# Patient Record
Sex: FEMALE | Race: OTHER | Employment: UNEMPLOYED | URBAN - METROPOLITAN AREA
[De-identification: names, ages, dates, MRNs, and addresses within clinical notes are randomized per-mention and may not be internally consistent; named-entity substitution may affect disease eponyms.]

---

## 2018-11-26 ENCOUNTER — HOSPITAL ENCOUNTER (EMERGENCY)
Facility: HOSPITAL | Age: 33
Discharge: HOME | End: 2018-11-27
Attending: EMERGENCY MEDICINE
Payer: COMMERCIAL

## 2018-11-26 DIAGNOSIS — N30.00 ACUTE CYSTITIS WITHOUT HEMATURIA: Primary | ICD-10-CM

## 2018-11-26 PROCEDURE — 81001 URINALYSIS AUTO W/SCOPE: CPT | Performed by: EMERGENCY MEDICINE

## 2018-11-26 PROCEDURE — 81001 URINALYSIS AUTO W/SCOPE: CPT

## 2018-11-26 PROCEDURE — 87086 URINE CULTURE/COLONY COUNT: CPT | Performed by: EMERGENCY MEDICINE

## 2018-11-26 PROCEDURE — 99283 EMERGENCY DEPT VISIT LOW MDM: CPT | Performed by: EMERGENCY MEDICINE

## 2018-11-27 VITALS
HEART RATE: 74 BPM | SYSTOLIC BLOOD PRESSURE: 111 MMHG | BODY MASS INDEX: 26.99 KG/M2 | DIASTOLIC BLOOD PRESSURE: 75 MMHG | OXYGEN SATURATION: 99 % | WEIGHT: 162 LBS | RESPIRATION RATE: 16 BRPM | TEMPERATURE: 98 F | HEIGHT: 65 IN

## 2018-11-27 RX ORDER — CIPROFLOXACIN 500 MG/1
500 TABLET, FILM COATED ORAL EVERY 12 HOURS SCHEDULED
Status: DISCONTINUED | OUTPATIENT
Start: 2018-11-27 | End: 2018-11-27

## 2018-11-27 RX ORDER — CEPHALEXIN 500 MG/1
500 CAPSULE ORAL 3 TIMES DAILY
Qty: 15 CAPSULE | Refills: 1 | Status: SHIPPED | OUTPATIENT
Start: 2018-11-27 | End: 2018-12-02

## 2018-11-27 RX ORDER — PHENAZOPYRIDINE HYDROCHLORIDE 200 MG/1
200 TABLET, FILM COATED ORAL ONCE
Status: COMPLETED | OUTPATIENT
Start: 2018-11-27 | End: 2018-11-27

## 2018-11-27 NOTE — ED INITIAL ASSESSMENT (HPI)
Pt to ER c/o urinary symptoms since Saturday. Pt has been taking Azo with moderate relief. Also reports lower back pain.

## 2018-11-27 NOTE — ED PROVIDER NOTES
Patient Seen in: [facility] Emergency Department    History   Patient presents with:  Urinary Symptoms (urologic)    Stated Complaint: uti sx    HPI    Burning in lower abdomen-states that she feels a going up into her back she has run out of her Az tenderness to palpation    ED Course     Labs Reviewed   URINALYSIS WITH CULTURE REFLEX - Abnormal; Notable for the following components:       Result Value    Urine Color Orange (*)     WBC Urine 21-50 (*)     RBC URINE 3-5 (*)     Squamous Epi.  Cells Lar

## 2019-06-05 ENCOUNTER — HOSPITAL ENCOUNTER (EMERGENCY)
Facility: HOSPITAL | Age: 34
Discharge: HOME | End: 2019-06-05
Attending: EMERGENCY MEDICINE
Payer: COMMERCIAL

## 2019-06-05 VITALS
BODY MASS INDEX: 25.83 KG/M2 | HEIGHT: 65 IN | DIASTOLIC BLOOD PRESSURE: 70 MMHG | WEIGHT: 155 LBS | SYSTOLIC BLOOD PRESSURE: 125 MMHG | TEMPERATURE: 98 F | HEART RATE: 88 BPM | RESPIRATION RATE: 18 BRPM | OXYGEN SATURATION: 98 %

## 2019-06-05 DIAGNOSIS — N39.0 URINARY TRACT INFECTION WITHOUT HEMATURIA, SITE UNSPECIFIED: Primary | ICD-10-CM

## 2019-06-05 PROCEDURE — 99283 EMERGENCY DEPT VISIT LOW MDM: CPT

## 2019-06-05 PROCEDURE — 81025 URINE PREGNANCY TEST: CPT

## 2019-06-05 PROCEDURE — 87086 URINE CULTURE/COLONY COUNT: CPT | Performed by: EMERGENCY MEDICINE

## 2019-06-05 PROCEDURE — 99284 EMERGENCY DEPT VISIT MOD MDM: CPT

## 2019-06-05 PROCEDURE — 81001 URINALYSIS AUTO W/SCOPE: CPT | Performed by: EMERGENCY MEDICINE

## 2019-06-05 RX ORDER — MELOXICAM 15 MG/1
15 TABLET ORAL DAILY
COMMUNITY

## 2019-06-05 RX ORDER — PHENAZOPYRIDINE HYDROCHLORIDE 100 MG/1
100 TABLET, FILM COATED ORAL 3 TIMES DAILY PRN
Qty: 6 TABLET | Refills: 0 | Status: SHIPPED | OUTPATIENT
Start: 2019-06-05 | End: 2019-06-08

## 2019-06-05 RX ORDER — SULFAMETHOXAZOLE AND TRIMETHOPRIM 800; 160 MG/1; MG/1
1 TABLET ORAL ONCE
Status: COMPLETED | OUTPATIENT
Start: 2019-06-05 | End: 2019-06-05

## 2019-06-05 RX ORDER — SULFAMETHOXAZOLE AND TRIMETHOPRIM 800; 160 MG/1; MG/1
1 TABLET ORAL 2 TIMES DAILY
Qty: 14 TABLET | Refills: 0 | Status: SHIPPED | OUTPATIENT
Start: 2019-06-05 | End: 2019-06-12

## 2019-06-05 NOTE — ED PROVIDER NOTES
Patient Seen in: [facility] Emergency Department    History   Patient presents with:  Urinary Symptoms (urologic)    Stated Complaint: uti symptoms    HPI    Patient is a 44-year-old female percents emergency room for evaluation of urinary symptoms. Oropharynx is unremarkable, no exudate. NECK: Supple, trachea midline, no lymphadenopathy. LUNG: Lungs clear to auscultation bilaterally, no wheezing, no rales, no rhonchi. CARDIOVASCULAR: Regular rate and rhythm. Normal S1S2. No S3S4 or murmur.   ABD but to return immediately to the ER for worsening, concerning, new, changing or persisting symptoms. I discussed the case with the patient and they had no questions, complaints, or concerns. Patient felt comfortable going home.                   Dispositi

## 2019-09-23 RX ORDER — CODEINE PHOSPHATE AND GUAIFENESIN 10; 100 MG/5ML; MG/5ML
10 SOLUTION ORAL EVERY 6 HOURS PRN
COMMUNITY

## 2019-09-23 RX ORDER — ERGOCALCIFEROL 1.25 MG/1
50000 CAPSULE ORAL WEEKLY
COMMUNITY

## 2019-09-23 RX ORDER — PREDNISONE 20 MG/1
20 TABLET ORAL DAILY
COMMUNITY

## 2019-09-23 RX ORDER — PROPRANOLOL/HYDROCHLOROTHIAZID 40 MG-25MG
1 TABLET ORAL DAILY
COMMUNITY

## 2019-10-01 RX ORDER — CLINDAMYCIN PHOSPHATE 600 MG/50ML
600 INJECTION INTRAVENOUS
Status: CANCELLED | OUTPATIENT
Start: 2019-10-01 | End: 2019-10-02

## 2019-10-01 NOTE — H&P
[facility]    History and Physical    Dez Gross Patient Status:  Hospital Outpatient Surgery    1985 MRN [id number]   [facility] SURGERY Attending Socorro Cheema MD   Hosp Day # 0 PCP None Pcp     Date:  10/1/2019  Date .0 06/17/2011    RPR NONREACTIVE 06/16/2011               Assessment/Plan:       Supracervical Laparoscopic Hysterectomy, Bilateral Salpingectomy, CLIP LEFT GONADAL VEIN            Md Christina Lee MD  10/1/2019

## 2019-10-02 ENCOUNTER — HOSPITAL ENCOUNTER (OUTPATIENT)
Facility: HOSPITAL | Age: 34
Setting detail: HOSPITAL OUTPATIENT SURGERY
Discharge: HOME | End: 2019-10-02
Attending: OBSTETRICS & GYNECOLOGY | Admitting: OBSTETRICS & GYNECOLOGY
Payer: COMMERCIAL

## 2019-10-02 ENCOUNTER — ANESTHESIA EVENT (OUTPATIENT)
Dept: SURGERY | Facility: HOSPITAL | Age: 34
End: 2019-10-02

## 2019-10-02 ENCOUNTER — ANESTHESIA (OUTPATIENT)
Dept: SURGERY | Facility: HOSPITAL | Age: 34
End: 2019-10-02

## 2019-10-02 VITALS
SYSTOLIC BLOOD PRESSURE: 100 MMHG | OXYGEN SATURATION: 96 % | HEART RATE: 73 BPM | BODY MASS INDEX: 28.23 KG/M2 | TEMPERATURE: 98 F | DIASTOLIC BLOOD PRESSURE: 62 MMHG | RESPIRATION RATE: 18 BRPM | HEIGHT: 65 IN | WEIGHT: 169.44 LBS

## 2019-10-02 PROCEDURE — 0UT74ZZ RESECTION OF BILATERAL FALLOPIAN TUBES, PERCUTANEOUS ENDOSCOPIC APPROACH: ICD-10-PCS | Performed by: OBSTETRICS & GYNECOLOGY

## 2019-10-02 PROCEDURE — 06BY4ZZ EXCISION OF LOWER VEIN, PERCUTANEOUS ENDOSCOPIC APPROACH: ICD-10-PCS | Performed by: OBSTETRICS & GYNECOLOGY

## 2019-10-02 PROCEDURE — 88307 TISSUE EXAM BY PATHOLOGIST: CPT | Performed by: OBSTETRICS & GYNECOLOGY

## 2019-10-02 PROCEDURE — 86850 RBC ANTIBODY SCREEN: CPT | Performed by: OBSTETRICS & GYNECOLOGY

## 2019-10-02 PROCEDURE — 86901 BLOOD TYPING SEROLOGIC RH(D): CPT | Performed by: OBSTETRICS & GYNECOLOGY

## 2019-10-02 PROCEDURE — 0UT94ZL RESECTION OF UTERUS, SUPRACERVICAL, PERCUTANEOUS ENDOSCOPIC APPROACH: ICD-10-PCS | Performed by: OBSTETRICS & GYNECOLOGY

## 2019-10-02 PROCEDURE — 88302 TISSUE EXAM BY PATHOLOGIST: CPT | Performed by: OBSTETRICS & GYNECOLOGY

## 2019-10-02 PROCEDURE — 86900 BLOOD TYPING SEROLOGIC ABO: CPT | Performed by: OBSTETRICS & GYNECOLOGY

## 2019-10-02 PROCEDURE — 51798 US URINE CAPACITY MEASURE: CPT | Performed by: OBSTETRICS & GYNECOLOGY

## 2019-10-02 RX ORDER — SCOLOPAMINE TRANSDERMAL SYSTEM 1 MG/1
PATCH, EXTENDED RELEASE TRANSDERMAL
Status: DISCONTINUED
Start: 2019-10-02 | End: 2019-10-02

## 2019-10-02 RX ORDER — HYDROMORPHONE HYDROCHLORIDE 1 MG/ML
0.5 INJECTION, SOLUTION INTRAMUSCULAR; INTRAVENOUS; SUBCUTANEOUS EVERY 5 MIN PRN
Status: ACTIVE | OUTPATIENT
Start: 2019-10-02 | End: 2019-10-02

## 2019-10-02 RX ORDER — HYDROCODONE BITARTRATE AND ACETAMINOPHEN 5; 325 MG/1; MG/1
2 TABLET ORAL AS NEEDED
Status: COMPLETED | OUTPATIENT
Start: 2019-10-02 | End: 2019-10-02

## 2019-10-02 RX ORDER — SCOLOPAMINE TRANSDERMAL SYSTEM 1 MG/1
1 PATCH, EXTENDED RELEASE TRANSDERMAL ONCE
Status: DISCONTINUED | OUTPATIENT
Start: 2019-10-02 | End: 2019-10-02

## 2019-10-02 RX ORDER — ONDANSETRON 2 MG/ML
4 INJECTION INTRAMUSCULAR; INTRAVENOUS AS NEEDED
Status: ACTIVE | OUTPATIENT
Start: 2019-10-02 | End: 2019-10-02

## 2019-10-02 RX ORDER — CLINDAMYCIN PHOSPHATE 900 MG/50ML
INJECTION INTRAVENOUS
Status: COMPLETED | OUTPATIENT
Start: 2019-10-02 | End: 2019-10-02

## 2019-10-02 RX ORDER — NALOXONE HYDROCHLORIDE 0.4 MG/ML
80 INJECTION, SOLUTION INTRAMUSCULAR; INTRAVENOUS; SUBCUTANEOUS AS NEEDED
Status: ACTIVE | OUTPATIENT
Start: 2019-10-02 | End: 2019-10-02

## 2019-10-02 RX ORDER — HYDROCODONE BITARTRATE AND ACETAMINOPHEN 5; 325 MG/1; MG/1
1 TABLET ORAL AS NEEDED
Status: COMPLETED | OUTPATIENT
Start: 2019-10-02 | End: 2019-10-02

## 2019-10-02 RX ORDER — SODIUM CHLORIDE, SODIUM LACTATE, POTASSIUM CHLORIDE, CALCIUM CHLORIDE 600; 310; 30; 20 MG/100ML; MG/100ML; MG/100ML; MG/100ML
INJECTION, SOLUTION INTRAVENOUS CONTINUOUS
Status: DISCONTINUED | OUTPATIENT
Start: 2019-10-02 | End: 2019-10-02

## 2019-10-02 RX ORDER — ACETAMINOPHEN 500 MG
1000 TABLET ORAL ONCE
Status: COMPLETED | OUTPATIENT
Start: 2019-10-02 | End: 2019-10-02

## 2019-10-02 RX ORDER — HYDROMORPHONE HYDROCHLORIDE 1 MG/ML
INJECTION, SOLUTION INTRAMUSCULAR; INTRAVENOUS; SUBCUTANEOUS
Status: COMPLETED
Start: 2019-10-02 | End: 2019-10-02

## 2019-10-02 RX ORDER — CLINDAMYCIN PHOSPHATE 900 MG/50ML
900 INJECTION INTRAVENOUS ONCE
Status: DISCONTINUED | OUTPATIENT
Start: 2019-10-02 | End: 2019-10-02 | Stop reason: HOSPADM

## 2019-10-02 RX ORDER — BUPIVACAINE HYDROCHLORIDE 5 MG/ML
INJECTION, SOLUTION EPIDURAL; INTRACAUDAL AS NEEDED
Status: DISCONTINUED | OUTPATIENT
Start: 2019-10-02 | End: 2019-10-02 | Stop reason: HOSPADM

## 2019-10-02 RX ORDER — PHENAZOPYRIDINE HYDROCHLORIDE 200 MG/1
200 TABLET, FILM COATED ORAL ONCE
Status: COMPLETED | OUTPATIENT
Start: 2019-10-02 | End: 2019-10-02

## 2019-10-02 RX ORDER — ACETAMINOPHEN 500 MG
1000 TABLET ORAL ONCE AS NEEDED
Status: DISCONTINUED | OUTPATIENT
Start: 2019-10-02 | End: 2019-10-02

## 2019-10-02 NOTE — ANESTHESIA POSTPROCEDURE EVALUATION
[address] Patient Status:  Hospital Outpatient Surgery   Age/Gender 36year old female MRN [id number]   [facility] SURGERY Attending Sandra Salgado MD   Hosp Day # 0 PCP None Pcp       Anesthesia Post-op Note    Pr
English

## 2019-10-02 NOTE — ANESTHESIA PREPROCEDURE EVALUATION
PRE-OP EVALUATION    Patient Name: Kieran Briceno    Pre-op Diagnosis: POLYCYSTIC OVARIAN SYNDROME    Procedure(s):  SUPRACERVICAL LAPAROSCOPIC HYSTERECTOMY, BILATERAL SALPINGECTOMY, CLIP LEFT GONADAL VEIN    Surgeon(s) and Role:     * Rodolfo Kenney, Cruzito Rojasor, Procedure Laterality Date   •       x 5   • IR VARICOSE VEIN ENDOVENOUS LASER ABLATION(CPT=36478)       Social History    Tobacco Use      Smoking status: Never Smoker      Smokeless tobacco: Never Used    Alcohol use: Never      Frequency: Neftali Willis

## 2019-10-02 NOTE — INTERVAL H&P NOTE
Pre-op Diagnosis: POLYCYSTIC OVARIAN SYNDROME    The above referenced H&P was reviewed by Md Dona Gordillo MD on 10/2/2019, the patient was examined and no significant changes have occurred in the patient's condition since the H&P was performed.   I disc

## 2019-10-02 NOTE — BRIEF OP NOTE
Pre-Operative Diagnosis: PELVIC PAIN     Post-Operative Diagnosis: same     Procedure Performed:   Procedure(s):  SUPRACERVICAL LAPAROSCOPIC HYSTERECTOMY, BILATERAL SALPINGECTOMY, Transection LEFT GONADAL VEIN    Surgeon(s) and Role:     * Luis Felipe Lara

## 2019-10-02 NOTE — OPERATIVE REPORT
[facility]    PATIENT'S NAME: Kali Seymourchana   ATTENDING PHYSICIAN: Lynne Lerner M.D. OPERATING PHYSICIAN: Lynne Lerner M.D.    PATIENT ACCOUNT#:   [de-identified]    LOCATION:  PACU [facility] PACU 10 EDWP 10  MEDICAL RECORD #:   [id number]       DATE OF NUVIA side of the umbilicus. A small rent was now made mid umbilicus with a #91 blade. Through this, a Veress needle was placed. Insufflation started with carbon dioxide gas.   When the pressure reached 14 mmHg, Veress needle was removed and replaced by laparo applied balloon port pierced the bag. Now, the laparoscope was placed through this right port. This allowed us to remove the 12 mm port at the mouth of the bag and replace it with the Rotocut Morcellator.   Under direct visualization, the morcellation was

## 2019-12-27 ENCOUNTER — HOSPITAL ENCOUNTER (EMERGENCY)
Facility: HOSPITAL | Age: 34
Discharge: HOME | End: 2019-12-28
Attending: STUDENT IN AN ORGANIZED HEALTH CARE EDUCATION/TRAINING PROGRAM
Payer: COMMERCIAL

## 2019-12-27 DIAGNOSIS — M54.89 BACK PAIN WITHOUT SCIATICA: ICD-10-CM

## 2019-12-27 DIAGNOSIS — R31.9 HEMATURIA, UNSPECIFIED TYPE: ICD-10-CM

## 2019-12-27 DIAGNOSIS — R10.9 ABDOMINAL PAIN OF UNKNOWN ETIOLOGY: Primary | ICD-10-CM

## 2019-12-27 LAB
ALBUMIN SERPL-MCNC: 3.5 G/DL (ref 3.4–5)
ALBUMIN/GLOB SERPL: 0.9 {RATIO} (ref 1–2)
ALP LIVER SERPL-CCNC: 82 U/L (ref 37–98)
ALT SERPL-CCNC: 13 U/L (ref 13–56)
ANION GAP SERPL CALC-SCNC: 6 MMOL/L (ref 0–18)
AST SERPL-CCNC: 19 U/L (ref 15–37)
BASOPHILS # BLD AUTO: 0.07 X10(3) UL (ref 0–0.2)
BASOPHILS NFR BLD AUTO: 0.6 %
BILIRUB SERPL-MCNC: 0.8 MG/DL (ref 0.1–2)
BILIRUB UR QL STRIP.AUTO: NEGATIVE
BUN BLD-MCNC: 9 MG/DL (ref 7–18)
BUN/CREAT SERPL: 10.8 (ref 10–20)
CALCIUM BLD-MCNC: 8.8 MG/DL (ref 8.5–10.1)
CHLORIDE SERPL-SCNC: 106 MMOL/L (ref 98–112)
CLARITY UR REFRACT.AUTO: CLEAR
CO2 SERPL-SCNC: 27 MMOL/L (ref 21–32)
COLOR UR AUTO: YELLOW
CREAT BLD-MCNC: 0.83 MG/DL (ref 0.55–1.02)
DEPRECATED RDW RBC AUTO: 38.5 FL (ref 35.1–46.3)
EOSINOPHIL # BLD AUTO: 0.19 X10(3) UL (ref 0–0.7)
EOSINOPHIL NFR BLD AUTO: 1.6 %
ERYTHROCYTE [DISTWIDTH] IN BLOOD BY AUTOMATED COUNT: 12.8 % (ref 11–15)
GLOBULIN PLAS-MCNC: 3.9 G/DL (ref 2.8–4.4)
GLUCOSE BLD-MCNC: 92 MG/DL (ref 70–99)
GLUCOSE UR STRIP.AUTO-MCNC: NEGATIVE MG/DL
HCT VFR BLD AUTO: 39.1 % (ref 35–48)
HGB BLD-MCNC: 12.3 G/DL (ref 12–16)
IMM GRANULOCYTES # BLD AUTO: 0.02 X10(3) UL (ref 0–1)
IMM GRANULOCYTES NFR BLD: 0.2 %
KETONES UR STRIP.AUTO-MCNC: NEGATIVE MG/DL
LEUKOCYTE ESTERASE UR QL STRIP.AUTO: NEGATIVE
LYMPHOCYTES # BLD AUTO: 3.88 X10(3) UL (ref 1–4)
LYMPHOCYTES NFR BLD AUTO: 31.9 %
M PROTEIN MFR SERPL ELPH: 7.4 G/DL (ref 6.4–8.2)
MCH RBC QN AUTO: 26.4 PG (ref 26–34)
MCHC RBC AUTO-ENTMCNC: 31.5 G/DL (ref 31–37)
MCV RBC AUTO: 83.9 FL (ref 80–100)
MONOCYTES # BLD AUTO: 0.9 X10(3) UL (ref 0.1–1)
MONOCYTES NFR BLD AUTO: 7.4 %
NEUTROPHILS # BLD AUTO: 7.09 X10 (3) UL (ref 1.5–7.7)
NEUTROPHILS # BLD AUTO: 7.09 X10(3) UL (ref 1.5–7.7)
NEUTROPHILS NFR BLD AUTO: 58.3 %
NITRITE UR QL STRIP.AUTO: NEGATIVE
OSMOLALITY SERPL CALC.SUM OF ELEC: 286 MOSM/KG (ref 275–295)
PH UR STRIP.AUTO: 6 [PH] (ref 4.5–8)
PLATELET # BLD AUTO: 252 10(3)UL (ref 150–450)
POTASSIUM SERPL-SCNC: 4.2 MMOL/L (ref 3.5–5.1)
PROT UR STRIP.AUTO-MCNC: NEGATIVE MG/DL
RBC # BLD AUTO: 4.66 X10(6)UL (ref 3.8–5.3)
SODIUM SERPL-SCNC: 139 MMOL/L (ref 136–145)
SP GR UR STRIP.AUTO: 1.01 (ref 1–1.03)
UROBILINOGEN UR STRIP.AUTO-MCNC: <2 MG/DL
WBC # BLD AUTO: 12.2 X10(3) UL (ref 4–11)

## 2019-12-27 PROCEDURE — 85025 COMPLETE CBC W/AUTO DIFF WBC: CPT | Performed by: STUDENT IN AN ORGANIZED HEALTH CARE EDUCATION/TRAINING PROGRAM

## 2019-12-27 PROCEDURE — 80053 COMPREHEN METABOLIC PANEL: CPT | Performed by: STUDENT IN AN ORGANIZED HEALTH CARE EDUCATION/TRAINING PROGRAM

## 2019-12-27 PROCEDURE — 96375 TX/PRO/DX INJ NEW DRUG ADDON: CPT

## 2019-12-27 PROCEDURE — 96376 TX/PRO/DX INJ SAME DRUG ADON: CPT

## 2019-12-27 PROCEDURE — 96361 HYDRATE IV INFUSION ADD-ON: CPT

## 2019-12-27 PROCEDURE — 99284 EMERGENCY DEPT VISIT MOD MDM: CPT

## 2019-12-27 PROCEDURE — 96374 THER/PROPH/DIAG INJ IV PUSH: CPT

## 2019-12-27 PROCEDURE — 81001 URINALYSIS AUTO W/SCOPE: CPT | Performed by: STUDENT IN AN ORGANIZED HEALTH CARE EDUCATION/TRAINING PROGRAM

## 2019-12-27 RX ORDER — ONDANSETRON 2 MG/ML
4 INJECTION INTRAMUSCULAR; INTRAVENOUS ONCE
Status: COMPLETED | OUTPATIENT
Start: 2019-12-27 | End: 2019-12-27

## 2019-12-27 RX ORDER — KETOROLAC TROMETHAMINE 30 MG/ML
15 INJECTION, SOLUTION INTRAMUSCULAR; INTRAVENOUS ONCE
Status: COMPLETED | OUTPATIENT
Start: 2019-12-27 | End: 2019-12-27

## 2019-12-27 RX ORDER — MORPHINE SULFATE 4 MG/ML
4 INJECTION, SOLUTION INTRAMUSCULAR; INTRAVENOUS EVERY 30 MIN PRN
Status: DISCONTINUED | OUTPATIENT
Start: 2019-12-27 | End: 2019-12-28

## 2019-12-28 ENCOUNTER — APPOINTMENT (OUTPATIENT)
Dept: CT IMAGING | Facility: HOSPITAL | Age: 34
End: 2019-12-28
Attending: STUDENT IN AN ORGANIZED HEALTH CARE EDUCATION/TRAINING PROGRAM
Payer: COMMERCIAL

## 2019-12-28 VITALS
WEIGHT: 169.75 LBS | BODY MASS INDEX: 28 KG/M2 | HEART RATE: 75 BPM | OXYGEN SATURATION: 96 % | DIASTOLIC BLOOD PRESSURE: 58 MMHG | RESPIRATION RATE: 16 BRPM | SYSTOLIC BLOOD PRESSURE: 97 MMHG

## 2019-12-28 PROCEDURE — 74177 CT ABD & PELVIS W/CONTRAST: CPT | Performed by: STUDENT IN AN ORGANIZED HEALTH CARE EDUCATION/TRAINING PROGRAM

## 2019-12-28 RX ORDER — CYCLOBENZAPRINE HCL 10 MG
10 TABLET ORAL 3 TIMES DAILY PRN
Qty: 20 TABLET | Refills: 0 | Status: SHIPPED | OUTPATIENT
Start: 2019-12-28 | End: 2020-01-04

## 2019-12-28 RX ORDER — DIAZEPAM 5 MG/ML
5 INJECTION, SOLUTION INTRAMUSCULAR; INTRAVENOUS ONCE
Status: COMPLETED | OUTPATIENT
Start: 2019-12-28 | End: 2019-12-28

## 2019-12-28 RX ORDER — IBUPROFEN 600 MG/1
600 TABLET ORAL EVERY 8 HOURS PRN
Qty: 30 TABLET | Refills: 0 | Status: SHIPPED | OUTPATIENT
Start: 2019-12-28 | End: 2020-01-04

## 2019-12-28 RX ORDER — DICYCLOMINE HCL 20 MG
20 TABLET ORAL 4 TIMES DAILY PRN
Qty: 30 TABLET | Refills: 0 | Status: SHIPPED | OUTPATIENT
Start: 2019-12-28 | End: 2020-01-27

## 2019-12-28 RX ORDER — HYDROCODONE BITARTRATE AND ACETAMINOPHEN 5; 325 MG/1; MG/1
1-2 TABLET ORAL EVERY 6 HOURS PRN
Qty: 10 TABLET | Refills: 0 | Status: SHIPPED | OUTPATIENT
Start: 2019-12-28 | End: 2020-01-04

## 2019-12-28 NOTE — ED PROVIDER NOTES
Patient Seen in: [facility] Emergency Department      History   Patient presents with:  Abdomen/Flank Pain  Postop/Procedure Problem    Stated Complaint: severe abd pain, s/p hysterectomy 12 weeks ago    HPI    Patient is a 52-year-old female who pr 2239]   /76   Pulse 79   Resp 14   Temp    Temp src    SpO2 99 %   O2 Device None (Room air)       Current:BP 97/58   Pulse 75   Resp 16   Wt 77 kg   LMP 09/12/2019   SpO2 96%   BMI 28.25 kg/m²         Physical Exam    Constitutional: oriented to per Abnormality         Status                     ---------                               -----------         ------                     CBC W/ DIFFERENTIAL[[id number]]          Abnormal            Final result                 Please view results for the Medication List as of 12/28/2019  2:20 AM    START taking these medications    HYDROcodone-acetaminophen 5-325 MG Oral Tab  Take 1-2 tablets by mouth every 6 (six) hours as needed for Pain., Print, Disp-10 tablet, R-0    Dicyclomine HCl 20 MG Oral Tab  Augustine Alvarado

## 2019-12-28 NOTE — ED NOTES
Received patient. Patient here with c/o abdominal/flank pain. Patient medicated with morphine. Patient AOx3, awaiting CT results.

## 2019-12-28 NOTE — ED NOTES
Assumed care of pt. Pt resting on cart, c/o pain 8/10. IV started and labs sent. Pt updated on plan of care. Warm blanket given. Family at bedside.

## 2020-09-21 ENCOUNTER — OFFICE VISIT (OUTPATIENT)
Dept: SURGERY | Facility: CLINIC | Age: 35
End: 2020-09-21
Payer: COMMERCIAL

## 2020-09-21 VITALS — DIASTOLIC BLOOD PRESSURE: 70 MMHG | SYSTOLIC BLOOD PRESSURE: 130 MMHG | HEART RATE: 68 BPM

## 2020-09-21 DIAGNOSIS — M54.50 RIGHT-SIDED LOW BACK PAIN WITHOUT SCIATICA, UNSPECIFIED CHRONICITY: ICD-10-CM

## 2020-09-21 DIAGNOSIS — N39.0 RECURRENT UTI: ICD-10-CM

## 2020-09-21 DIAGNOSIS — R82.90 URINE FINDING: Primary | ICD-10-CM

## 2020-09-21 LAB
APPEARANCE: CLEAR
BILIRUB UR QL: NEGATIVE
COLOR UR: YELLOW
GLUCOSE UR-MCNC: NEGATIVE MG/DL
KETONES UR-MCNC: NEGATIVE MG/DL
LEUKOCYTE ESTERASE UR QL STRIP.AUTO: NEGATIVE
MULTISTIX LOT#: 1044 NUMERIC
NITRITE UR QL STRIP.AUTO: NEGATIVE
PH UR: 5 [PH] (ref 5–8)
PH, URINE: 6 (ref 4.5–8)
PROT UR-MCNC: 30 MG/DL
RBC #/AREA URNS AUTO: 4 /HPF
SP GR UR STRIP: 1.03 (ref 1–1.03)
SPECIFIC GRAVITY: 1.03 (ref 1–1.03)
UROBILINOGEN UR STRIP-ACNC: <2
UROBILINOGEN,SEMI-QN: 0.2 MG/DL (ref 0–1.9)
WBC #/AREA URNS AUTO: 1 /HPF

## 2020-09-21 PROCEDURE — 3078F DIAST BP <80 MM HG: CPT | Performed by: UROLOGY

## 2020-09-21 PROCEDURE — 99243 OFF/OP CNSLTJ NEW/EST LOW 30: CPT | Performed by: UROLOGY

## 2020-09-21 PROCEDURE — 3075F SYST BP GE 130 - 139MM HG: CPT | Performed by: UROLOGY

## 2020-09-21 PROCEDURE — 81003 URINALYSIS AUTO W/O SCOPE: CPT | Performed by: UROLOGY

## 2020-09-21 NOTE — PROGRESS NOTES
Rooming Clinician:     Guido Bruce is a 30year old female. Patient presents with:  Consult: c/o Frequent UTI's        HPI:     Patient comes to the office with a history of frequent urinary tract infections since age 2.   She has had them over the cour infection  Nickel                  RASH   Past Medical History:   Diagnosis Date   • Migraines     triggers: hormone   • PONV (postoperative nausea and vomiting)     nausea,lightheadedness   • Scoliosis    • UTI (urinary tract infection)      Past Surgical 12/27/2019    ALKPHO 82 12/27/2019    AST 19 12/27/2019    ALT 13 12/27/2019       X-RAY[de-identified]         ASSESSMENT:     History of recurrent urinary tract infections doubt interstitial cystitis  Back pain, musculoskeletal in origin    PLAN:     [facility] ROUTINE    Recurrent UTI    Right-sided low back pain without sciatica, unspecified chronicity          The patient indicates understanding of these issues and agrees to the plan. Clayton Lao M.D.   Urology

## 2020-09-24 ENCOUNTER — TELEPHONE (OUTPATIENT)
Dept: SURGERY | Facility: CLINIC | Age: 35
End: 2020-09-24

## 2020-09-24 NOTE — TELEPHONE ENCOUNTER
----- Message from Paige Cerrato MD sent at 9/23/2020  2:34 PM CDT -----  Your recent urine microscopic analysis shows 4 rbcs and a few bacteria and could just be a contaminant. .  Recommend finish antibiotic and call me in 2 weeks.  Will probably need

## 2020-09-24 NOTE — TELEPHONE ENCOUNTER
Spoke with patient and informed her of UA result and recommendation from Dr Umer Gordillo.   Had a lengthy conversation about the current condition of the world with Corona virus and expressed that she does not want to have her cysto in the hospital.  She states

## 2022-06-01 ENCOUNTER — APPOINTMENT (OUTPATIENT)
Dept: CT IMAGING | Age: 37
End: 2022-06-01

## 2022-06-01 ENCOUNTER — HOSPITAL ENCOUNTER (EMERGENCY)
Age: 37
Discharge: HOME | End: 2022-06-02
Attending: EMERGENCY MEDICINE

## 2022-06-01 VITALS
HEART RATE: 88 BPM | WEIGHT: 181.33 LBS | TEMPERATURE: 98.5 F | DIASTOLIC BLOOD PRESSURE: 81 MMHG | OXYGEN SATURATION: 99 % | SYSTOLIC BLOOD PRESSURE: 136 MMHG | RESPIRATION RATE: 20 BRPM

## 2022-06-01 DIAGNOSIS — N83.201 RIGHT OVARIAN CYST: Primary | ICD-10-CM

## 2022-06-01 LAB
ALBUMIN SERPL-MCNC: 4 G/DL (ref 3.6–5.1)
ALBUMIN/GLOB SERPL: 1.1 {RATIO} (ref 1–2.4)
ALP SERPL-CCNC: 75 UNITS/L (ref 45–117)
ALT SERPL-CCNC: 24 UNITS/L
ANION GAP SERPL CALC-SCNC: 17 MMOL/L (ref 7–19)
APPEARANCE UR: CLEAR
AST SERPL-CCNC: 19 UNITS/L
BACTERIA #/AREA URNS HPF: ABNORMAL /HPF
BASOPHILS # BLD: 0.1 K/MCL (ref 0–0.3)
BASOPHILS NFR BLD: 0 %
BILIRUB SERPL-MCNC: 0.8 MG/DL (ref 0.2–1)
BILIRUB UR QL STRIP: NEGATIVE
BUN SERPL-MCNC: 12 MG/DL (ref 6–20)
BUN/CREAT SERPL: 16 (ref 7–25)
CALCIUM SERPL-MCNC: 8.8 MG/DL (ref 8.4–10.2)
CHLORIDE SERPL-SCNC: 105 MMOL/L (ref 97–110)
CO2 SERPL-SCNC: 25 MMOL/L (ref 21–32)
COLOR UR: YELLOW
CREAT SERPL-MCNC: 0.73 MG/DL (ref 0.51–0.95)
DEPRECATED RDW RBC: 38.6 FL (ref 39–50)
EOSINOPHIL # BLD: 0.3 K/MCL (ref 0–0.5)
EOSINOPHIL NFR BLD: 2 %
ERYTHROCYTE [DISTWIDTH] IN BLOOD: 12.7 % (ref 11–15)
FASTING DURATION TIME PATIENT: NORMAL H
GFR SERPLBLD BASED ON 1.73 SQ M-ARVRAT: >90 ML/MIN
GLOBULIN SER-MCNC: 3.7 G/DL (ref 2–4)
GLUCOSE SERPL-MCNC: 98 MG/DL (ref 70–99)
GLUCOSE UR STRIP-MCNC: NEGATIVE MG/DL
HCG UR QL: NEGATIVE
HCT VFR BLD CALC: 39.1 % (ref 36–46.5)
HGB BLD-MCNC: 12.2 G/DL (ref 12–15.5)
HGB UR QL STRIP: ABNORMAL
HYALINE CASTS #/AREA URNS LPF: ABNORMAL /LPF
IMM GRANULOCYTES # BLD AUTO: 0 K/MCL (ref 0–0.2)
IMM GRANULOCYTES # BLD: 0 %
KETONES UR STRIP-MCNC: NEGATIVE MG/DL
LEUKOCYTE ESTERASE UR QL STRIP: NEGATIVE
LIPASE SERPL-CCNC: 99 UNITS/L (ref 73–393)
LYMPHOCYTES # BLD: 3.5 K/MCL (ref 1–4.8)
LYMPHOCYTES NFR BLD: 29 %
MCH RBC QN AUTO: 26.3 PG (ref 26–34)
MCHC RBC AUTO-ENTMCNC: 31.2 G/DL (ref 32–36.5)
MCV RBC AUTO: 84.4 FL (ref 78–100)
MONOCYTES # BLD: 0.8 K/MCL (ref 0.3–0.9)
MONOCYTES NFR BLD: 7 %
NEUTROPHILS # BLD: 7.3 K/MCL (ref 1.8–7.7)
NEUTROPHILS NFR BLD: 62 %
NITRITE UR QL STRIP: POSITIVE
NRBC BLD MANUAL-RTO: 0 /100 WBC
PH UR STRIP: 5.5 [PH] (ref 5–7)
PLATELET # BLD AUTO: 252 K/MCL (ref 140–450)
POTASSIUM SERPL-SCNC: 4.6 MMOL/L (ref 3.4–5.1)
PROT SERPL-MCNC: 7.7 G/DL (ref 6.4–8.2)
PROT UR STRIP-MCNC: NEGATIVE MG/DL
RBC # BLD: 4.63 MIL/MCL (ref 4–5.2)
RBC #/AREA URNS HPF: ABNORMAL /HPF
SODIUM SERPL-SCNC: 142 MMOL/L (ref 135–145)
SP GR UR STRIP: >=1.03 (ref 1–1.03)
SQUAMOUS #/AREA URNS HPF: ABNORMAL /HPF
UROBILINOGEN UR STRIP-MCNC: 0.2 MG/DL
WBC # BLD: 11.9 K/MCL (ref 4.2–11)
WBC #/AREA URNS HPF: ABNORMAL /HPF

## 2022-06-01 PROCEDURE — 99284 EMERGENCY DEPT VISIT MOD MDM: CPT

## 2022-06-01 PROCEDURE — 85025 COMPLETE CBC W/AUTO DIFF WBC: CPT | Performed by: NURSE PRACTITIONER

## 2022-06-01 PROCEDURE — 83690 ASSAY OF LIPASE: CPT | Performed by: NURSE PRACTITIONER

## 2022-06-01 PROCEDURE — 74176 CT ABD & PELVIS W/O CONTRAST: CPT

## 2022-06-01 PROCEDURE — 81001 URINALYSIS AUTO W/SCOPE: CPT | Performed by: PHYSICIAN ASSISTANT

## 2022-06-01 PROCEDURE — 96374 THER/PROPH/DIAG INJ IV PUSH: CPT

## 2022-06-01 PROCEDURE — 80053 COMPREHEN METABOLIC PANEL: CPT | Performed by: NURSE PRACTITIONER

## 2022-06-01 PROCEDURE — G1004 CDSM NDSC: HCPCS

## 2022-06-01 PROCEDURE — 84703 CHORIONIC GONADOTROPIN ASSAY: CPT

## 2022-06-01 PROCEDURE — 96361 HYDRATE IV INFUSION ADD-ON: CPT

## 2022-06-01 PROCEDURE — 96375 TX/PRO/DX INJ NEW DRUG ADDON: CPT

## 2022-06-01 PROCEDURE — 87086 URINE CULTURE/COLONY COUNT: CPT | Performed by: PHYSICIAN ASSISTANT

## 2022-06-01 PROCEDURE — 96376 TX/PRO/DX INJ SAME DRUG ADON: CPT

## 2022-06-01 RX ORDER — ONDANSETRON 2 MG/ML
4 INJECTION INTRAMUSCULAR; INTRAVENOUS ONCE
Status: COMPLETED | OUTPATIENT
Start: 2022-06-01 | End: 2022-06-02

## 2022-06-01 ASSESSMENT — ENCOUNTER SYMPTOMS
WEAKNESS: 0
PSYCHIATRIC NEGATIVE: 1
SORE THROAT: 0
VOMITING: 0
HEMATOLOGIC/LYMPHATIC NEGATIVE: 1
DIZZINESS: 0
COUGH: 0
DIARRHEA: 0
ABDOMINAL PAIN: 1
CHILLS: 0
FATIGUE: 0
HEADACHES: 0
EYE PAIN: 0
SHORTNESS OF BREATH: 0
CONSTIPATION: 0
NUMBNESS: 0
FEVER: 0
BACK PAIN: 0

## 2022-06-01 ASSESSMENT — VISUAL ACUITY: OU: 1

## 2022-06-02 ENCOUNTER — APPOINTMENT (OUTPATIENT)
Dept: ULTRASOUND IMAGING | Age: 37
End: 2022-06-02

## 2022-06-02 LAB
HCT VFR BLD CALC: 34.4 % (ref 36–46.5)
HGB BLD-MCNC: 10.7 G/DL (ref 12–15.5)

## 2022-06-02 PROCEDURE — 10002800 HB RX 250 W HCPCS: Performed by: PHYSICIAN ASSISTANT

## 2022-06-02 PROCEDURE — 93975 VASCULAR STUDY: CPT

## 2022-06-02 PROCEDURE — 76856 US EXAM PELVIC COMPLETE: CPT

## 2022-06-02 PROCEDURE — 10002807 HB RX 258: Performed by: PHYSICIAN ASSISTANT

## 2022-06-02 PROCEDURE — 85018 HEMOGLOBIN: CPT | Performed by: PHYSICIAN ASSISTANT

## 2022-06-02 RX ORDER — HYDROCODONE BITARTRATE AND ACETAMINOPHEN 5; 325 MG/1; MG/1
1 TABLET ORAL EVERY 6 HOURS PRN
Qty: 12 TABLET | Refills: 0 | Status: SHIPPED | OUTPATIENT
Start: 2022-06-02

## 2022-06-02 RX ORDER — NAPROXEN 500 MG/1
500 TABLET ORAL 2 TIMES DAILY WITH MEALS
Qty: 14 TABLET | Refills: 0 | Status: SHIPPED | OUTPATIENT
Start: 2022-06-02 | End: 2022-06-09

## 2022-06-02 RX ORDER — CEFAZOLIN SODIUM/WATER 1 G/10 ML
1000 SYRINGE (ML) INTRAVENOUS ONCE
Status: COMPLETED | OUTPATIENT
Start: 2022-06-02 | End: 2022-06-02

## 2022-06-02 RX ORDER — ONDANSETRON 4 MG/1
4 TABLET, ORALLY DISINTEGRATING ORAL EVERY 6 HOURS
Qty: 10 TABLET | Refills: 0 | Status: SHIPPED | OUTPATIENT
Start: 2022-06-02

## 2022-06-02 RX ADMIN — KETOROLAC TROMETHAMINE 30 MG: 30 INJECTION, SOLUTION INTRAMUSCULAR at 01:06

## 2022-06-02 RX ADMIN — CEFTRIAXONE SODIUM 1000 MG: 10 INJECTION, POWDER, FOR SOLUTION INTRAVENOUS at 01:06

## 2022-06-02 RX ADMIN — MORPHINE SULFATE 4 MG: 4 INJECTION INTRAVENOUS at 00:15

## 2022-06-02 RX ADMIN — SODIUM CHLORIDE 1000 ML: 9 INJECTION, SOLUTION INTRAVENOUS at 00:12

## 2022-06-02 RX ADMIN — ONDANSETRON 4 MG: 2 INJECTION INTRAMUSCULAR; INTRAVENOUS at 00:13

## 2022-06-02 RX ADMIN — MORPHINE SULFATE 4 MG: 4 INJECTION INTRAVENOUS at 01:06

## 2022-06-02 ASSESSMENT — PAIN SCALES - GENERAL: PAINLEVEL_OUTOF10: 7

## 2022-06-02 ASSESSMENT — ENCOUNTER SYMPTOMS: NAUSEA: 1

## 2022-06-03 LAB — BACTERIA UR CULT: NORMAL

## (undated) DEVICE — ULNAR NERVE CUSHION

## (undated) DEVICE — SOL  .9 1000ML BTL

## (undated) DEVICE — LAPAROSCOPIC TISSUE RETRIEVAL SAC FOR USE WITH MINIMALLY INVASIVE PROCEDURES: Brand: ESPINER TISSUE RETRIEVAL SYSTEM

## (undated) DEVICE — GYN LAP CDS: Brand: MEDLINE INDUSTRIES, INC.

## (undated) DEVICE — 40580 - THE PINK PAD - ADVANCED TRENDELENBURG POSITIONING KIT: Brand: 40580 - THE PINK PAD - ADVANCED TRENDELENBURG POSITIONING KIT

## (undated) DEVICE — TROCAR: Brand: KII SHIELDED BLADED ACCESS SYSTEM

## (undated) DEVICE — KENDALL SCD EXPRESS SLEEVES, KNEE LENGTH, MEDIUM: Brand: KENDALL SCD

## (undated) DEVICE — MORCELLATOR KNIFE: Brand: N.A.

## (undated) DEVICE — INSUFFLATION NEEDLE TO ESTABLISH PNEUMOPERITONEUM.: Brand: INSUFFLATION NEEDLE

## (undated) DEVICE — SUTURE MONOCRYL 4-0 PS-2

## (undated) DEVICE — DECANTER BAG 9": Brand: MEDLINE INDUSTRIES, INC.

## (undated) DEVICE — GLOVE SURG TRIUMPH SZ 71/2

## (undated) DEVICE — SOL  .9 3000ML

## (undated) DEVICE — TROCAR: Brand: KII FIOS FIRST ENTRY

## (undated) DEVICE — TROCAR: Brand: KII® SLEEVE

## (undated) DEVICE — STRYKER HARMONIC 36CM REPRCSED

## (undated) DEVICE — SOLUTION SURG DURA PREP HAZMAT

## (undated) DEVICE — PKS LYONS DISSECTING FORCEPS 5MM/33CM: Brand: PK TECHNOLOGY

## (undated) NOTE — ED AVS SNAPSHOT
Ms. Bruce Seth   MRN: [id number]    Department:  [facility] Emergency Department   Date of Visit:  12/27/2019           Disclosure     Insurance plans vary and the physician(s) referred by the ER may not be covered by your plan.  Please contact y tell this physician (or your personal doctor if your instructions are to return to your personal doctor) about any new or lasting problems. The primary care or specialist physician will see patients referred from the [facility] Emergency Department.  Mukul Lyles

## (undated) NOTE — ED AVS SNAPSHOT
Ms. Erasmo Severe   MRN: [id number]    Department:  [facility] Emergency Department   Date of Visit:  6/5/2019           Disclosure     Insurance plans vary and the physician(s) referred by the ER may not be covered by your plan.  Please contact you tell this physician (or your personal doctor if your instructions are to return to your personal doctor) about any new or lasting problems. The primary care or specialist physician will see patients referred from the [facility] Emergency Department.  Efraín Alejo

## (undated) NOTE — LETTER
[facility] Testing Department  Phone: [phone]  OUTSIDE TESTING RESULT REQUEST      TO:   Shadia Ibarra Today's Date: 9/23/19    FAX #: [phone]     IMPORTANT: FOR YOUR IMMEDIATE ATTENTION  Please FAX all test results listed below t

## (undated) NOTE — LETTER
Pre-Procedure  Jericho Moreno MD        I acknowledge that I have been informed of the risk involved by refusing the urine hcg on Jimena Sandoval.     I, thereby, release Ed

## (undated) NOTE — ED AVS SNAPSHOT
Ms. Brooke Amador   MRN: [id number]    Department:  [facility] Emergency Department   Date of Visit:  11/26/2018           Disclosure     Insurance plans vary and the physician(s) referred by the ER may not be covered by your plan.  Please contact y tell this physician (or your personal doctor if your instructions are to return to your personal doctor) about any new or lasting problems. The primary care or specialist physician will see patients referred from the [facility] Emergency Department.  Lisbeth Craig